# Patient Record
(demographics unavailable — no encounter records)

---

## 2025-05-12 NOTE — DISCUSSION/SUMMARY
[FreeTextEntry1] : multiple pleural plaque noncalcified history of asbestos exposure chest x-ray 2011 was noted  Patient completely asymptomatic discussed at length with him decision  repeat the CAT scan in 1 year